# Patient Record
(demographics unavailable — no encounter records)

---

## 2024-10-18 NOTE — DATA REVIEWED
[FreeTextEntry1] : Available notes, and pertinent labs & imaging in EMR reviewed. Paper records reviewed, scanned to EMR. Pertinent labs and imaging summarized in HPI or A/P.  Labs from outside LARRY 1:160 speckled pattern  Negative SPEP, ganglioside antibodies  Normal ESR and B6  EMG/NCS 7/2024 Impressions:  Nerve conduction testing and EMG of the lower extremities is consistent with a chronic bilateral L4-L5 radiculopathy and a sensory polyneuropathy.

## 2024-10-18 NOTE — PHYSICAL EXAM
[General Appearance - Alert] : alert [Sclera] : the sclera and conjunctiva were normal [PERRL With Normal Accommodation] : pupils were equal in size, round, and reactive to light [Outer Ear] : the ears and nose were normal in appearance [Neck Appearance] : the appearance of the neck was normal [Neck Cervical Mass (___cm)] : no neck mass was observed [Heart Sounds] : normal S1 and S2 [Abdomen Soft] : soft [Musculoskeletal - Swelling] : no joint swelling seen [Motor Tone] : muscle strength and tone were normal [] : no rash [Sensation] : the sensory exam was normal to light touch and pinprick [Oriented To Time, Place, And Person] : oriented to person, place, and time [FreeTextEntry1] : full sensation intact in bilateral and upper lower extremities. No synovitis appreciated in any joints. 5/5 strength in proximal and distal upper and lower extremities. No wrist or foot drop on exam. Patient ambulated in room, hestitancy with gait but no dragging of feet. Was able to walk on heel without a foot fade. Notable though for nodule of right thumb area with z like deformity

## 2024-10-18 NOTE — ASSESSMENT
[FreeTextEntry1] : Assessment: #Bilateral lower extremities with a chronic bilateral L4-L5 radiculopathy and a sensory polyneuropathy from EMG 7/2024 with outside neurologist Dr. Michael Nissenbaum -B6 levels, SPEP, ESR normal  -unclear etiology at this time, ROS largely unremarkable   #Positive LARRY 1:160 speckled pattern -ROS negative for a systemic rheumatologic process at this time   #Prostate CA -not currently on any chemo or RT, under active surveillance with outside urology   Discussion: RODGER FISCHER is a 78 year old man with a PMH of prostate CA, BPH, who presents with neuropathy.    Based on available history, exam, and diagnostics patient's etiology of the neuropathy still remains unclear, but will initiate full autoimmune workup. Will also screen for B12, A1c, and hepatitis labs.   Notably today on his right thumb he has a nodule vs cyst on the dorsal side but also a z-like thumb deformity. No inflammatory joint symptoms consistent with RA or Gout but will check labs in any case along with imaging.    Plan: -follow up labs including LARRY subsets, hep B, hep C, quantiferon, HIV, ANCA, MPO/PR3, RF, CCP, uric acid  -follow up bilateral hand xrays and ultrasound of right hand  -as of now no indication for immunosuppressive therapy -continue to follow with Dr. Nissenbaum from neurology     All questions and concerns addressed to my best possible ability, patient verbalizes understanding and is agreeable.   RTC in 1 month

## 2024-10-18 NOTE — HISTORY OF PRESENT ILLNESS
[FreeTextEntry1] : Rheumatology Consultation Note   Chief Complaint: neuropathy    History of Present Illness: RODGER FISCHER is a 78 year old man with a PMH of prostate CA, BPH, who presents with neuropathy.   Gait instability started within the last year. Before that was walking 2 miles per day. One day then walking didn't have control. Didn't lose sensation. Sometimes legs feel heavy. No pain. Certain days feels can walk, certain days feels can't. No inciting trauma. Upper extremity no issues. Strength is feeling fine in both upper and lower extremities. Just more concerned he leans forward when he walks and might fall.   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 No hx of diabetes.   Not tried any PT as of now.  Not on any neuropathy medications.   Also mentinoed that he has this bump on right thumb that has been there for 20 years. Doesn't bother him though. No hand pain or swelling in that area.  Family hx: negative for any autoimmune disease  Inflammatory arthritis ROS negative for symmetrical peripheral joint synovitis, prolonged AM stiffness, enthesitis, dactylitis, psoriasis/ rashes, eye inflammation, inflammatory low back pain, IBD. SLE ROS negative for oral ulcers, facial rash, other rash, chest pain, abdominal pain, hair loss, Raynaud's, joint swelling, unexplained numbness or weakness, seizures, frothy urine or hematuria. The patient denies having any oral ulcers, red/painful eyes, hemoptysis, epistaxis, cough, ear infection, sinus infection, unexplained numbness or weakness, rashes, joint swelling, joint pain, blood in the urine, frothy urine, fevers, chills, recent infections, night sweats, or weight loss. APLS ROS negative for thrombotic events.

## 2024-12-02 NOTE — ASSESSMENT
[FreeTextEntry1] : Assessment: #Bilateral lower extremities with a chronic bilateral L4-L5 radiculopathy and a sensory polyneuropathy from EMG 7/2024 with outside neurologist Dr. 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    Based on available history, exam, and diagnostics patient's neuropathy unlikely to be rheumatologic in nature given negative work up and lack of ROS. I have encouraged him to still follow up with his neurologist Dr. Nissenbaum, in any case his gait is stable and he is not falling.   Nodule on his right 1st digit is likely per ultrasound report benign giant tumor of the tendon sheath, i have given him an orthopedic referral to see if it any further workup needs to be done but he says he is not interested at this time given it's not bothered him for 30 years.   Plan: -referral placed to orthopedics  -as of now no indication for immunosuppressive therapy from rheumatological perspective  -continue to follow with Dr. Nissenbaum from neurology  -for prediabetes, he is going to follow up with PCP in January 2025     All questions and concerns addressed to my best possible ability, patient verbalizes understanding and is agreeable.   RTC as needed

## 2024-12-02 NOTE — DATA REVIEWED
[FreeTextEntry1] : Available notes, and pertinent labs & imaging in EMR reviewed. Paper records reviewed, scanned to EMR. Pertinent labs and imaging summarized in HPI or A/P.  Labs from outside LARRY 1:160 speckled pattern  Negative SPEP, ganglioside antibodies  Normal ESR and B6  10/2024 labs  Negative CBC, CMP, UA, UPCR, quantiferon, hep B and C serologies, uric acid, ESR, CRP, dsDNA, anti smith, anti RNP, SSA/SSB, HIV, C and P Anca, C3, C4, RF, CCP, B12 A1c is 5.8   EMG/NCS 7/2024 Impressions:  Nerve conduction testing and EMG of the lower extremities is consistent with a chronic bilateral L4-L5 radiculopathy and a sensory polyneuropathy.   U/S right first digit 11/2024  FINDINGS: There is a heterogeneous predominantly hypoechoic lesion with marked hyperemia at the dorsal aspect of the first digit. This lesion roughly measures 4.2 x 1.3 x 1.5 cm. There is a questionable connection to the medial aspect of the first extensor tendon. IMPRESSION: Heterogeneous predominantly hypoechoic lesion with marked hyperemia at the dorsal aspect of the first digit. There is likely connection to the medial aspect of the first extensor tendon. This is indeterminate in etiology, however possibly represents a giant tumor of the tendon sheath given the relation to the tendon. This could something more concerning however less likely given the reported lack of growth or pain in the last 30 years

## 2024-12-02 NOTE — HISTORY OF PRESENT ILLNESS
[FreeTextEntry1] : Rheumatology Consultation Note   Chief Complaint: neuropathy    Brief Rheumatological Hx: RODGER FISCHER is a 78 year old man with a PMH of prostate CA, BPH, who presents with neuropathy.   Gait instability started within the last year. Before that was walking 2 miles per day. One day then walking didn't have control. Didn't lose sensation. Sometimes legs feel heavy. No pain. Certain days feels can walk, certain days feels can't. No inciting trauma. Upper extremity no issues. Strength is feeling fine in both upper and lower extremities. Just more concerned he leans forward when he walks and might fall.   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 No hx of diabetes.   Not tried any PT as of now.  Not on any neuropathy medications.   Also mentinoed that he has this bump on right thumb that has been there for 20 years. Doesn't bother him though. No hand pain or swelling in that area.  Family hx: negative for any autoimmune disease  12/2:  Called and spoke to patient about ultrasound result on 11/13/24: FINDINGS: There is a heterogeneous predominantly hypoechoic lesion with marked hyperemia at the dorsal aspect of the first digit. This lesion roughly measures 4.2 x 1.3 x 1.5 cm. There is a questionable connection to the medial aspect of the first extensor tendon. IMPRESSION: Heterogeneous predominantly hypoechoic lesion with marked hyperemia at the dorsal aspect of the first digit. There is likely connection to the medial aspect of the first extensor tendon. This is indeterminate in etiology, however possibly represents a giant tumor of the tendon sheath given the relation to the tendon. This could something more concerning however less likely given the reported lack of growth or pain in the last 30 years  Given the finding and that it's been around for 30 years, unlikely to be malignant but told patient i would like for him to see an orthopedic specialist to see if any additional workup should be done. Otherwise no findings of tophi to suspect gout or nodule to suspect RA. Patient is agreeable to this.  Otherwise patient with no new symptoms. He says overall his feeling of unsteadiness is improved but comes and goes. No dizziness endorsed.   Inflammatory arthritis ROS negative for symmetrical peripheral joint synovitis, prolonged AM stiffness, enthesitis, dactylitis, psoriasis/ rashes, eye inflammation, inflammatory low back pain, IBD. SLE ROS negative for oral ulcers, facial rash, other rash, chest pain, abdominal pain, hair loss, Raynaud's, joint swelling, unexplained numbness or weakness, seizures, frothy urine or hematuria. The patient denies having any oral ulcers, red/painful eyes, hemoptysis, epistaxis, cough, ear infection, sinus infection, unexplained numbness or weakness, rashes, joint swelling, joint pain, blood in the urine, frothy urine, fevers, chills, recent infections, night sweats, or weight loss. APLS ROS negative for thrombotic events.

## 2024-12-02 NOTE — PHYSICAL EXAM
[General Appearance - Alert] : alert [PERRL With Normal Accommodation] : pupils were equal in size, round, and reactive to light [Neck Appearance] : the appearance of the neck was normal [Heart Sounds] : normal S1 and S2 [Abdomen Soft] : soft [Musculoskeletal - Swelling] : no joint swelling seen [Motor Tone] : muscle strength and tone were normal [] : no rash [FreeTextEntry1] : right 1st finger soft tissue mobile mass, no threatened digits or skin ulcers, strong neck flexors, gait was slow but steady when pt walked from end to end of room

## 2025-01-09 NOTE — HEALTH RISK ASSESSMENT
[No falls in past year] : Patient reported no falls in the past year [0] : 2) Feeling down, depressed, or hopeless: Not at all (0) [PHQ-2 Negative - No further assessment needed] : PHQ-2 Negative - No further assessment needed [Never] : Never [Fully functional (bathing, dressing, toileting, transferring, walking, feeding)] : Fully functional (bathing, dressing, toileting, transferring, walking, feeding) [Fully functional (using the telephone, shopping, preparing meals, housekeeping, doing laundry, using] : Fully functional and needs no help or supervision to perform IADLs (using the telephone, shopping, preparing meals, housekeeping, doing laundry, using transportation, managing medications and managing finances) [Never (0 pts)] : Never (0 points) [No] : In the past 12 months have you used drugs other than those required for medical reasons? No [[id number]] : 0 [NO] : No [None] : None

## 2025-01-09 NOTE — HISTORY OF PRESENT ILLNESS
[de-identified] : This is a 78 -year-old gentleman who is here for his annual examination. He has a history of prostate carcinoma for which is being treated . He refuses any immunizations

## 2025-01-09 NOTE — PHYSICAL EXAM
[No Acute Distress] : no acute distress [Well Nourished] : well nourished [Well Developed] : well developed [Well-Appearing] : well-appearing [Normal Sclera/Conjunctiva] : normal sclera/conjunctiva [PERRL] : pupils equal round and reactive to light [EOMI] : extraocular movements intact [Normal Outer Ear/Nose] : the outer ears and nose were normal in appearance [Normal Oropharynx] : the oropharynx was normal [No JVD] : no jugular venous distention [Supple] : supple [No Lymphadenopathy] : no lymphadenopathy [Thyroid Normal, No Nodules] : the thyroid was normal and there were no nodules present [No Respiratory Distress] : no respiratory distress  [Clear to Auscultation] : lungs were clear to auscultation bilaterally [No Accessory Muscle Use] : no accessory muscle use [Normal Rate] : normal rate  [Regular Rhythm] : with a regular rhythm [Normal S1, S2] : normal S1 and S2 [No Murmur] : no murmur heard [No Carotid Bruits] : no carotid bruits [No Abdominal Bruit] : a ~M bruit was not heard ~T in the abdomen [No Varicosities] : no varicosities [Pedal Pulses Present] : the pedal pulses are present [No Edema] : there was no peripheral edema [No Extremity Clubbing/Cyanosis] : no extremity clubbing/cyanosis [No Palpable Aorta] : no palpable aorta [Soft] : abdomen soft [Non Tender] : non-tender [Non-distended] : non-distended [No Masses] : no abdominal mass palpated [No HSM] : no HSM [Normal Bowel Sounds] : normal bowel sounds [Normal Sphincter Tone] : normal sphincter tone [No Mass] : no mass [Normal Posterior Cervical Nodes] : no posterior cervical lymphadenopathy [Normal Anterior Cervical Nodes] : no anterior cervical lymphadenopathy [No CVA Tenderness] : no CVA  tenderness [No Spinal Tenderness] : no spinal tenderness [No Joint Swelling] : no joint swelling [Grossly Normal Strength/Tone] : grossly normal strength/tone [No Rash] : no rash [Normal Gait] : normal gait [Coordination Grossly Intact] : coordination grossly intact [No Focal Deficits] : no focal deficits [Normal Affect] : the affect was normal [Deep Tendon Reflexes (DTR)] : deep tendon reflexes were 2+ and symmetric [Normal Mood] : the mood was normal [Normal Insight/Judgement] : insight and judgment were intact [Stool Occult Blood] : stool negative for occult blood [de-identified] : BPH

## 2025-01-09 NOTE — ASSESSMENT
[FreeTextEntry1] : Encounter for preventive health examination (V70.0) (Z00.00) BPH without urinary obstruction (600.00) (N40.0) Prostate carcinoma Anxiety (300.00) (F41.9) Transaminitis (790.4) (R74.01) Snoring His vital signs are stable. His physical examination is normal except for BPH. His EKG reveals an incomplete right bundle branch block. He was referred to cardiology. The patient presently refuses any vaccinations. I referred him for sleep apnea study

## 2025-02-20 NOTE — HISTORY OF PRESENT ILLNESS
[No Pertinent Pulmonary History] : no history of asthma, COPD, sleep apnea, or smoking [No Adverse Anesthesia Reaction] : no adverse anesthesia reaction in self or family member [No Pertinent Cardiac History] : no history of aortic stenosis, atrial fibrillation, coronary artery disease, recent myocardial infarction, or implantable device/pacemaker [Chronic Anticoagulation] : no chronic anticoagulation [Chronic Kidney Disease] : no chronic kidney disease [FreeTextEntry1] : Prostate biopsy [FreeTextEntry3] : Dr Schultz [FreeTextEntry4] : This is a 78-year-old gentleman in good health aside from a history of BPH and elevated PSA who is scheduled for prostate biopsy

## 2025-02-20 NOTE — ASSESSMENT
[Patient Optimized for Surgery] : Patient optimized for surgery [No Further Testing Recommended] : no further testing recommended [Continue medications as is] : Continue current medications [FreeTextEntry4] : The patient's blood pressure and physical examination are normal.  He is medically cleared for surgery.  Urine and urine

## 2025-02-20 NOTE — RESULTS/DATA
[] : results reviewed [de-identified] : No acute changes [de-identified] : EKG and bloods are normal.  Urine and urine cultures pending. Urinalysis is normal . Urine culture is normal . He is medically cleared for surgery

## 2025-02-20 NOTE — RESULTS/DATA
[] : results reviewed [de-identified] : No acute changes [de-identified] : EKG and bloods are normal.  Urine and urine cultures pending. Urinalysis is normal . Urine culture is normal . He is medically cleared for surgery

## 2025-02-20 NOTE — RESULTS/DATA
[] : results reviewed [de-identified] : No acute changes [de-identified] : EKG and bloods are normal.  Urine and urine cultures pending. Urinalysis is normal . Urine culture is normal . He is medically cleared for surgery